# Patient Record
Sex: FEMALE | Race: AMERICAN INDIAN OR ALASKA NATIVE
[De-identification: names, ages, dates, MRNs, and addresses within clinical notes are randomized per-mention and may not be internally consistent; named-entity substitution may affect disease eponyms.]

---

## 2019-02-13 ENCOUNTER — HOSPITAL ENCOUNTER (OUTPATIENT)
Dept: HOSPITAL 5 - LAB | Age: 39
Discharge: HOME | End: 2019-02-13
Attending: OBSTETRICS & GYNECOLOGY
Payer: COMMERCIAL

## 2019-02-13 DIAGNOSIS — Z01.419: Primary | ICD-10-CM

## 2019-02-13 PROCEDURE — 36415 COLL VENOUS BLD VENIPUNCTURE: CPT

## 2019-02-21 ENCOUNTER — HOSPITAL ENCOUNTER (OUTPATIENT)
Dept: HOSPITAL 5 - MAMMO | Age: 39
Discharge: HOME | End: 2019-02-21
Attending: OBSTETRICS & GYNECOLOGY
Payer: COMMERCIAL

## 2019-02-21 DIAGNOSIS — N63.13: Primary | ICD-10-CM

## 2019-02-21 PROCEDURE — 77066 DX MAMMO INCL CAD BI: CPT

## 2019-02-21 NOTE — ULTRASOUND REPORT
BILATERAL DIGITAL DIAGNOSTIC MAMMOGRAM with CAD and RIGHT BREAST 

ULTRASOUND: 02/21/19



CLINICAL: A right breast lump felt by her doctor.  It is described to 

be in the lower outer quadrant and she does not feel a lump.



COMPARISON:None.  These are baseline studies.



FINDINGS: The breasts are heterogeneously dense, which may obscure 

small masses.  A right retroareolar asymmetry persists on a spot MLO 

compression view.No architectural distortion or suspicious 

calcifications.  The left breast is negative.. 



Ultrasound of the right breast (including all four quadrants and the 

retroareolar area) was performed and demonstrated normal fibroglandular 

and fatty structures.  No mass, cyst or shadowing and no ultrasound 

finding to correlate with the mammographic asymmetry on the right MLO 

view.



IMPRESSION: A probably benign right mammographic asymmetry with a 

negative ultrasound.  Negative left breast.  



BI-RADS CATEGORY:  3 - - Probably Benign



RECOMMENDATION: 6 month followup right mammogram and right breast 

ultrasound if needed.



ACR BI-RADS MAMMOGRAPHIC CODES:

0 = Needs additional imaging evaluation; 1 = Negative; 2 = Benign; 3 = 

Probably benign; 4 = Suspicious; 5 = Malignant; 6 = Known biopsy-proven 

malignancy



COMMENT:

      1.   Dense breast tissue, i.e., adenosis, fibrocystic 

            changes, etc., may obscure an underlying neoplasm.

      2.   Approximately 10% of cancers are not detected with

            mammography.

      3.   A negative mammography report should not delay biopsy 

            if a clinically suspicious mass is present.





COMMENT:

Patient follow-up letters are generated by our [employer] application.

## 2019-02-21 NOTE — MAMMOGRAPHY REPORT
BILATERAL DIGITAL DIAGNOSTIC MAMMOGRAM with CAD and RIGHT BREAST 

ULTRASOUND: 02/21/19



CLINICAL: A right breast lump felt by her doctor.  It is described to 

be in the lower outer quadrant and she does not feel a lump.



COMPARISON:None.  These are baseline studies.



FINDINGS: The breasts are heterogeneously dense, which may obscure 

small masses.  A right retroareolar asymmetry persists on a spot MLO 

compression view.No architectural distortion or suspicious 

calcifications.  The left breast is negative.. 



Ultrasound of the right breast (including all four quadrants and the 

retroareolar area) was performed and demonstrated normal fibroglandular 

and fatty structures.  No mass, cyst or shadowing and no ultrasound 

finding to correlate with the mammographic asymmetry on the right MLO 

view.



IMPRESSION: A probably benign right mammographic asymmetry with a 

negative ultrasound.  Negative left breast.  



BI-RADS CATEGORY:  3 - - Probably Benign



RECOMMENDATION: 6 month followup right mammogram and right breast 

ultrasound if needed.



ACR BI-RADS MAMMOGRAPHIC CODES:

0 = Needs additional imaging evaluation; 1 = Negative; 2 = Benign; 3 = 

Probably benign; 4 = Suspicious; 5 = Malignant; 6 = Known biopsy-proven 

malignancy



COMMENT:

      1.   Dense breast tissue, i.e., adenosis, fibrocystic 

            changes, etc., may obscure an underlying neoplasm.

      2.   Approximately 10% of cancers are not detected with

            mammography.

      3.   A negative mammography report should not delay biopsy 

            if a clinically suspicious mass is present.





COMMENT:

Patient follow-up letters are generated by our [employer] application.

## 2019-02-27 ENCOUNTER — HOSPITAL ENCOUNTER (OUTPATIENT)
Dept: HOSPITAL 5 - LAB | Age: 39
Discharge: HOME | End: 2019-02-27
Attending: OBSTETRICS & GYNECOLOGY
Payer: COMMERCIAL

## 2019-02-27 DIAGNOSIS — D64.9: ICD-10-CM

## 2019-02-27 DIAGNOSIS — Z00.8: Primary | ICD-10-CM

## 2019-02-27 LAB
ALBUMIN SERPL-MCNC: 4.1 G/DL (ref 3.9–5)
ALT SERPL-CCNC: < 5 UNITS/L (ref 7–56)
BASOPHILS # (AUTO): 0 K/MM3 (ref 0–0.1)
BASOPHILS NFR BLD AUTO: 0.6 % (ref 0–1.8)
BUN SERPL-MCNC: 11 MG/DL (ref 7–17)
BUN/CREAT SERPL: 22 %
CALCIUM SERPL-MCNC: 8.7 MG/DL (ref 8.4–10.2)
EOSINOPHIL # BLD AUTO: 0.1 K/MM3 (ref 0–0.4)
EOSINOPHIL NFR BLD AUTO: 2.8 % (ref 0–4.3)
HCT VFR BLD CALC: 29.1 % (ref 30.3–42.9)
HDLC SERPL-MCNC: 77 MG/DL (ref 40–59)
HEMOLYSIS INDEX: 1
HGB BLD-MCNC: 9.6 GM/DL (ref 10.1–14.3)
LYMPHOCYTES # BLD AUTO: 1.3 K/MM3 (ref 1.2–5.4)
LYMPHOCYTES NFR BLD AUTO: 42.6 % (ref 13.4–35)
MCHC RBC AUTO-ENTMCNC: 33 % (ref 30–34)
MCV RBC AUTO: 79 FL (ref 79–97)
MONOCYTES # (AUTO): 0.3 K/MM3 (ref 0–0.8)
MONOCYTES % (AUTO): 9.6 % (ref 0–7.3)
PLATELET # BLD: 268 K/MM3 (ref 140–440)
RBC # BLD AUTO: 3.68 M/MM3 (ref 3.65–5.03)

## 2019-02-27 PROCEDURE — 85025 COMPLETE CBC W/AUTO DIFF WBC: CPT

## 2019-02-27 PROCEDURE — 80061 LIPID PANEL: CPT

## 2019-02-27 PROCEDURE — 36415 COLL VENOUS BLD VENIPUNCTURE: CPT

## 2019-02-27 PROCEDURE — 80053 COMPREHEN METABOLIC PANEL: CPT
